# Patient Record
Sex: MALE | Race: WHITE | NOT HISPANIC OR LATINO | Employment: FULL TIME | URBAN - METROPOLITAN AREA
[De-identification: names, ages, dates, MRNs, and addresses within clinical notes are randomized per-mention and may not be internally consistent; named-entity substitution may affect disease eponyms.]

---

## 2023-09-05 ENCOUNTER — ALLIED HEALTH/NURSE VISIT (OUTPATIENT)
Dept: FAMILY MEDICINE | Facility: CLINIC | Age: 28
End: 2023-09-05
Payer: COMMERCIAL

## 2023-09-05 DIAGNOSIS — Z23 NEED FOR VACCINATION: Primary | ICD-10-CM

## 2023-09-05 PROCEDURE — 90715 TDAP VACCINE 7 YRS/> IM: CPT

## 2023-09-05 PROCEDURE — 99207 PR NO CHARGE NURSE ONLY: CPT

## 2023-09-05 PROCEDURE — 86580 TB INTRADERMAL TEST: CPT

## 2023-09-05 PROCEDURE — 90471 IMMUNIZATION ADMIN: CPT

## 2023-09-05 NOTE — NURSING NOTE
"Patient is here today for a Mantoux (TST) test placement.    Is there a current order in the chart? No. Placed order according to standing order (reference the \"Skin Test- Tuberculosis Screening- Ambulatory Care\" standing order in [employer]). Review the Inclusion and Exclusion Criteria.        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 Patient to return for second test in 1-3 weeks after first test is read.     Exclusion Criteria  None - Place order for Mantoux (TST) test per standing order.    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 No - form not needed per patient.    Instructed patient to wait for 15 minutes post injection and to report any reactions immediately to staff.    Told patient to return to clinic in 48-72 hours to have Mantoux (TST) read.         Prior to immunization administration, verified patients identity using patient s name and date of birth. Please see Immunization Activity for additional information.     Screening Questionnaire for Adult Immunization    Are you sick today?   No   Do you have allergies to medications, food, a vaccine component or latex?   No   Have you ever had a serious reaction after receiving a vaccination?   No   Do you have a long-term health problem with heart, lung, kidney, or metabolic disease (e.g., diabetes), asthma, a blood disorder, no spleen, complement component deficiency, a cochlear implant, or a spinal fluid leak?  Are you on long-term aspirin therapy?   No   Do you have cancer, leukemia, HIV/AIDS, or any other immune system problem?   No   Do you have a parent, brother, or sister with an immune system problem?   No   In the past 3 months, have you taken medications that affect  your immune system, such as prednisone, other steroids, or anticancer drugs; drugs for the treatment of rheumatoid arthritis, " Crohn s disease, or psoriasis; or have you had radiation treatments?   No   Have you had a seizure, or a brain or other nervous system problem?   No   During the past year, have you received a transfusion of blood or blood    products, or been given immune (gamma) globulin or antiviral drug?   No   For women: Are you pregnant or is there a chance you could become       pregnant during the next month?   No   Have you received any vaccinations in the past 4 weeks?   No     Immunization questionnaire answers were all negative.    I have reviewed the following standing orders:   This patient is due and qualifies for a TDAP vaccine.    Click here for Tdap Standing Order    I have reviewed the vaccines inclusion and exclusion criteria; No concerns regarding eligibility.     Patient instructed to remain in clinic for 15 minutes afterwards, and to report any adverse reactions.     Screening performed by Zoie South on 9/5/2023 at 10:28 AM.

## 2023-09-08 ENCOUNTER — ALLIED HEALTH/NURSE VISIT (OUTPATIENT)
Dept: FAMILY MEDICINE | Facility: CLINIC | Age: 28
End: 2023-09-08
Payer: COMMERCIAL

## 2023-09-08 DIAGNOSIS — Z11.1 SCREENING EXAMINATION FOR PULMONARY TUBERCULOSIS: Primary | ICD-10-CM

## 2023-09-08 LAB
PPDINDURATION: 0 MM (ref 0–4.99)
PPDREDNESS: NORMAL

## 2023-09-08 PROCEDURE — 99207 PR NO CHARGE NURSE ONLY: CPT

## 2023-09-08 NOTE — RESULT ENCOUNTER NOTE
Rafael,  I have reviewed your labs, and they are all normal. If you have questions, please notify me through MyChart or a telephone call.   Radha Lyons, DNP

## 2023-09-08 NOTE — PROGRESS NOTES
Patient is here today for a Mantoux (TST) test results.    Did patient return to clinic 48-72 hours from Mantoux (TST) placement: Yes -     PPD Induration   Date Value Ref Range Status   09/08/2023 0 0 - 4.99 mm Final     PPD Redness   Date Value Ref Range Status   09/08/2023 Not Present  Final     Induration Size? N/A    Patient needs form signed? No, letter sent to portal    Patient reports having previously had the BCG Vaccine: No    Does patient need a two step? No, patient does not think he needs a two steps, will confirm and call back to schedule if he does, RN advised of time frame this would need to be completed in    KERRIE PhanN, RN  [facility] ~ Registered Nurse  Clinic Triage ~ [place] [facility] [place]  September 8, 2023

## 2023-09-08 NOTE — LETTER
[facility] [place]  [address], [address]  [place] [address]  [phone]          9/8/2023          To Whom it May Concern:     Rafael Man, male, 1995 has had a mantoux placed on Tuesday, September 5th, 2023 at 10:38am. Patient returned to have mantoux results read on Friday, September 8th, 2023 at 9:15am.       Mantoux result is NEGATIVE:  Lab Results   Component Value Date    PPDREDNESS Not Present 09/08/2023    PPDINDURATIO 0 09/08/2023         Please contact me for questions or concerns.    Sincerely,  KERRIE PhanN, RN  [facility] ~ Registered Nurse  Clinic Triage ~ [place] [facility] [place]  September 8, 2023

## 2023-09-30 ENCOUNTER — HEALTH MAINTENANCE LETTER (OUTPATIENT)
Age: 28
End: 2023-09-30

## 2024-07-17 SDOH — HEALTH STABILITY: PHYSICAL HEALTH: ON AVERAGE, HOW MANY DAYS PER WEEK DO YOU ENGAGE IN MODERATE TO STRENUOUS EXERCISE (LIKE A BRISK WALK)?: 5 DAYS

## 2024-07-17 SDOH — HEALTH STABILITY: PHYSICAL HEALTH: ON AVERAGE, HOW MANY MINUTES DO YOU ENGAGE IN EXERCISE AT THIS LEVEL?: 30 MIN

## 2024-07-17 ASSESSMENT — SOCIAL DETERMINANTS OF HEALTH (SDOH): HOW OFTEN DO YOU GET TOGETHER WITH FRIENDS OR RELATIVES?: MORE THAN THREE TIMES A WEEK

## 2024-07-22 ENCOUNTER — OFFICE VISIT (OUTPATIENT)
Dept: FAMILY MEDICINE | Facility: CLINIC | Age: 29
End: 2024-07-22
Payer: COMMERCIAL

## 2024-07-22 VITALS
BODY MASS INDEX: 33.93 KG/M2 | TEMPERATURE: 98 F | OXYGEN SATURATION: 98 % | SYSTOLIC BLOOD PRESSURE: 133 MMHG | HEART RATE: 81 BPM | DIASTOLIC BLOOD PRESSURE: 84 MMHG | HEIGHT: 73 IN | WEIGHT: 256 LBS

## 2024-07-22 DIAGNOSIS — E66.811 CLASS 1 OBESITY DUE TO EXCESS CALORIES WITHOUT SERIOUS COMORBIDITY WITH BODY MASS INDEX (BMI) OF 33.0 TO 33.9 IN ADULT: ICD-10-CM

## 2024-07-22 DIAGNOSIS — K21.9 GASTROESOPHAGEAL REFLUX DISEASE WITHOUT ESOPHAGITIS: ICD-10-CM

## 2024-07-22 DIAGNOSIS — Z00.00 ROUTINE GENERAL MEDICAL EXAMINATION AT A HEALTH CARE FACILITY: Primary | ICD-10-CM

## 2024-07-22 DIAGNOSIS — R03.0 ELEVATED BLOOD PRESSURE READING WITHOUT DIAGNOSIS OF HYPERTENSION: ICD-10-CM

## 2024-07-22 DIAGNOSIS — E66.09 CLASS 1 OBESITY DUE TO EXCESS CALORIES WITHOUT SERIOUS COMORBIDITY WITH BODY MASS INDEX (BMI) OF 33.0 TO 33.9 IN ADULT: ICD-10-CM

## 2024-07-22 LAB
ALBUMIN SERPL BCG-MCNC: 4.9 G/DL (ref 3.5–5.2)
ALP SERPL-CCNC: 75 U/L (ref 40–150)
ALT SERPL W P-5'-P-CCNC: 26 U/L (ref 0–70)
ANION GAP SERPL CALCULATED.3IONS-SCNC: 10 MMOL/L (ref 7–15)
AST SERPL W P-5'-P-CCNC: 32 U/L (ref 0–45)
BILIRUB SERPL-MCNC: 0.3 MG/DL
BUN SERPL-MCNC: 13 MG/DL (ref 6–20)
CALCIUM SERPL-MCNC: 9.7 MG/DL (ref 8.8–10.4)
CHLORIDE SERPL-SCNC: 103 MMOL/L (ref 98–107)
CHOLEST SERPL-MCNC: 216 MG/DL
CREAT SERPL-MCNC: 0.97 MG/DL (ref 0.67–1.17)
EGFRCR SERPLBLD CKD-EPI 2021: >90 ML/MIN/1.73M2
ERYTHROCYTE [DISTWIDTH] IN BLOOD BY AUTOMATED COUNT: 11.9 % (ref 10–15)
FASTING STATUS PATIENT QL REPORTED: YES
FASTING STATUS PATIENT QL REPORTED: YES
GLUCOSE SERPL-MCNC: 93 MG/DL (ref 70–99)
HCO3 SERPL-SCNC: 26 MMOL/L (ref 22–29)
HCT VFR BLD AUTO: 43.6 % (ref 40–53)
HDLC SERPL-MCNC: 27 MG/DL
HGB BLD-MCNC: 15.2 G/DL (ref 13.3–17.7)
LDLC SERPL CALC-MCNC: 148 MG/DL
MCH RBC QN AUTO: 30.5 PG (ref 26.5–33)
MCHC RBC AUTO-ENTMCNC: 34.9 G/DL (ref 31.5–36.5)
MCV RBC AUTO: 88 FL (ref 78–100)
NONHDLC SERPL-MCNC: 189 MG/DL
PLATELET # BLD AUTO: 199 10E3/UL (ref 150–450)
POTASSIUM SERPL-SCNC: 4.5 MMOL/L (ref 3.4–5.3)
PROT SERPL-MCNC: 8 G/DL (ref 6.4–8.3)
RBC # BLD AUTO: 4.98 10E6/UL (ref 4.4–5.9)
SODIUM SERPL-SCNC: 139 MMOL/L (ref 135–145)
TRIGL SERPL-MCNC: 206 MG/DL
WBC # BLD AUTO: 4.6 10E3/UL (ref 4–11)

## 2024-07-22 PROCEDURE — 85027 COMPLETE CBC AUTOMATED: CPT | Performed by: FAMILY MEDICINE

## 2024-07-22 PROCEDURE — 80061 LIPID PANEL: CPT | Performed by: FAMILY MEDICINE

## 2024-07-22 PROCEDURE — 36415 COLL VENOUS BLD VENIPUNCTURE: CPT | Performed by: FAMILY MEDICINE

## 2024-07-22 PROCEDURE — 99385 PREV VISIT NEW AGE 18-39: CPT | Performed by: FAMILY MEDICINE

## 2024-07-22 PROCEDURE — 80053 COMPREHEN METABOLIC PANEL: CPT | Performed by: FAMILY MEDICINE

## 2024-07-22 PROCEDURE — 99213 OFFICE O/P EST LOW 20 MIN: CPT | Mod: 25 | Performed by: FAMILY MEDICINE

## 2024-07-22 ASSESSMENT — PAIN SCALES - GENERAL: PAINLEVEL: NO PAIN (0)

## 2024-07-22 NOTE — PATIENT INSTRUCTIONS
"Patient Education     Regarding your Blood Pressure:  We care about monitoring your blood pressure. We need to treat individuals with high blood pressure to decrease their risk of heart disease, stroke, and kidney disease.    Your blood pressure was elevated today. Your goal blood pressure is at least <140/90.    While I have not yet diagnosed you with hypertension (high blood pressure), I do need you to have this rechecked. Please schedule a follow up \"MA/Nursing\" visit to check your blood pressure in 2 weeks. You do not need to see me that day. If it is still high at that time, we will likely need to add a medication to treat this.    To also get some readings outside the clinic, I recommend you monitor your blood pressure twice daily (taking 2 readings each time, 1-2 minutes apart) with a home blood pressure cuff. You may need to purchase this if you do not already have one. Report these numbers to me by calling the clinic, or messaging on [employer] after 2 weeks.     If applicable, decrease your caffeine consumption.    Limit alcohol use.    Stay on a strict sleep schedule, going to bed at the same time, get up at the same time, and give yourself at least 8 hours.  If noticing snoring, daytime sleepiness, or episodes where you stop breathing in your sleep request a sleep study.    Work on weight loss/maintaining a healthy weight:  Follow the DASH diet. (https://www.nhlbi.nih.gov/education/dash-eating-plan)  Increase exercise to 30 minutes a day 5 days a week (150 minutes a week on average).    Some over the counter medications can increase blood pressures like:  Sudafed (pseudoephedrine) containing products (also called \"decongestants\")   NSAID medications like ibuprofen (Advil and Motrin) and naproxen (Aleve or Naprosyn)          Regarding your Heartburn:  Gastroesophageal reflux disease, or GERD, is a common condition where stomach acid frequently flows back into the tube that connects the mouth to the stomach, " "called the esophagus. This can cause discomfort and irritation, commonly known as \"heartburn\".     The most common causes of GERD include:  A weakened valve at the bottom of the esophagus, which usually prevents stomach acid from coming back up.  A hiatal hernia, where part of the stomach pushes up into the chest.  Use of certain medications such as:  Pain/Fever Reducers:  Ibuprofen (Advil, Motrin)  Naproxen (Aleve, Naprosyn)   Aspirin  Antibiotics  Doxycycline  Minocycline  Clindamycin  Supplements:  Iron  Potassium  Anti-depressants  Amitriptyline   Bone Density Drugs:  Alendronate (Fosamax)  Others (Not a complete list)  Infections cause irritation of the stomach, or esophagus, such as a bacteria called H. Pylori, and a fungal infection called thrush.  A less common cause is Eosinophilic Esophagitis, a kind of allergy.     It's important to avoid certain foods that can trigger symptoms:  Chocolate  Citrus fruits  Spicy foods (Costa Rican, Mexican, etc)  Caffeine  Peppermint  Fatty meals  Tomato's and tomato based sauces  Carbonated beverages may worsen symptoms and should be limited.     Eating smaller meals and avoiding lying down right after eating can also be helpful.    Keep a symptom journal to help determine if there is a certain trigger(s) to your symptoms (examples: exercise, use of medications/supplements, certain types of foods/beverages, positions, clothing, time of day, stress levels)    At-home treatments include:  Raising the head of your bed at night to reduce the chance of stomach acid flowing back into the esophagus. You could also use a wedge pillow for this.  Maintaining a healthy weight   Not smoking  Over the counter medications such as:  Tums as needed to provide quick relief by neutralizing acid.  Famotidine (Pepcid/Zantac 360) 20 mg twice daily as needed, or scheduled, to keep symptoms controlled by reducing acid production.  Omeprazole (Prilosec) or Esomeprazole (Nexium) 20 - 40 mg daily can " be used for persistent symptoms.   These medications may be taken for 4-8 weeks. Follow-up with me if needing the medications longer than this.  Rebound symptoms may occur if these medications are stopped abruptly. It may be best to reduce the dose gradually, or to take the medication every other day for 1-2 weeks prior to completely stopping these medications.     Medical tests to evaluate GERD may include an upper endoscopy, where a thin tube with a camera is inserted into the esophagus to examine it, and pH monitoring, which measures the acidity in the esophagus over a period of time. These tests help doctors understand the extent of the condition and plan appropriate treatment.    Concerning symptoms of GERD include:  Persistent heartburn or vomiting  Severe pain  Difficulty swallowing  Black tarry stools  Blood in your stools or vomit    If you experiences these symptoms regularly, it's important to follow-up with me, or another doctor right away. Consider going to the emergency department if they occur.          Preventive Care Advice   This is general advice given by our system to help you stay healthy. However, your care team may have specific advice just for you. Please talk to your care team about your preventive care needs.  Nutrition  Eat 5 or more servings of fruits and vegetables each day.  Try wheat bread, brown rice and whole grain pasta (instead of white bread, rice, and pasta).  Get enough calcium and vitamin D. Check the label on foods and aim for 100% of the RDA (recommended daily allowance).  Lifestyle  Exercise at least 150 minutes each week  (30 minutes a day, 5 days a week).  Do muscle strengthening activities 2 days a week. These help control your weight and prevent disease.  No smoking.  Wear sunscreen to prevent skin cancer.  Have a dental exam and cleaning every 6 months.  Yearly exams  See your health care team every year to talk about:  Any changes in your health.  Any medicines your  care team has prescribed.  Preventive care, family planning, and ways to prevent chronic diseases.  Shots (vaccines)   HPV shots (up to age 26), if you've never had them before.  Hepatitis B shots (up to age 59), if you've never had them before.  COVID-19 shot: Get this shot when it's due.  Flu shot: Get a flu shot every year.  Tetanus shot: Get a tetanus shot every 10 years.  Pneumococcal, hepatitis A, and RSV shots: Ask your care team if you need these based on your risk.  Shingles shot (for age 50 and up)  General health tests  Diabetes screening:  Starting at age 35, Get screened for diabetes at least every 3 years.  If you are younger than age 35, ask your care team if you should be screened for diabetes.  Cholesterol test: At age 39, start having a cholesterol test every 5 years, or more often if advised.  Bone density scan (DEXA): At age 50, ask your care team if you should have this scan for osteoporosis (brittle bones).  Hepatitis C: Get tested at least once in your life.  STIs (sexually transmitted infections)  Before age 24: Ask your care team if you should be screened for STIs.  After age 24: Get screened for STIs if you're at risk. You are at risk for STIs (including HIV) if:  You are sexually active with more than one person.  You don't use condoms every time.  You or a partner was diagnosed with a sexually transmitted infection.  If you are at risk for HIV, ask about PrEP medicine to prevent HIV.  Get tested for HIV at least once in your life, whether you are at risk for HIV or not.  Cancer screening tests  Cervical cancer screening: If you have a cervix, begin getting regular cervical cancer screening tests starting at age 21.  Breast cancer scan (mammogram): If you've ever had breasts, begin having regular mammograms starting at age 40. This is a scan to check for breast cancer.  Colon cancer screening: It is important to start screening for colon cancer at age 45.  Have a colonoscopy test every 10  years (or more often if you're at risk) Or, ask your provider about stool tests like a FIT test every year or Cologuard test every 3 years.  To learn more about your testing options, visit:   .  For help making a decision, visit:   https://bit.ly/ta50994.  Prostate cancer screening test: If you have a prostate, ask your care team if a prostate cancer screening test (PSA) at age 55 is right for you.  Lung cancer screening: If you are a current or former smoker ages 50 to 80, ask your care team if ongoing lung cancer screenings are right for you.  For informational purposes only. Not to replace the advice of your health care provider. Copyright   2023 [facility]. All rights reserved. Clinically reviewed by the [facility]itions [facility]. [employer] [id number] - REV 01/24.

## 2024-07-22 NOTE — RESULT ENCOUNTER NOTE
Humphrey Hall,    If you have not viewed these results on [employer] within 3 days, we will use an alternative method to contact you. We will contact you via the following protocol:    - Via letter if your results are normal.  - Via phone ([phone]) if your results are abnormal.     Here are my comments about your recent results:    CBC Results - Your cell counts were normal.    Please call the clinic ([phone]), or message us on [employer] with any questions you may have.     Have a great day,    Dr. Mackenzie

## 2024-07-22 NOTE — LETTER
"July 25, 2024      Rafael Man  [address]  [place] [address]        Dear Rafael,    Here are my comments about your recent results:     Lipids - Your \"bad\" cholesterol was elevated. This can be improved with diet and exercise. All animal based foods such as meat, dairy, and eggs contain cholesterol. All plant based foods such as fruits, nuts, and vegetables do not.     You do not need a medication for this at this time.     CMP Results - Your blood sugar was normal. Your kidney function, electrolytes, and liver function were normal.     Please call the clinic ([phone]), or message us on [employer] with any questions you may have.     Have a great day,     Dr. Mackenzie                  "

## 2024-07-22 NOTE — PROGRESS NOTES
Preventive Care Visit  [facility] [place] Ruiz MD, Family Medicine  Jul 22, 2024    Assessment & Plan   1. Routine general medical examination at a health care facility  Discussed personal health and safety. Routine screenings ordered as below. Appropriate anticipatory guidance, vaccinations, and health screening recommendations delivered according to the USPSTF and other appropriate society guidelines. Rafael reports understanding and in agreement with this mutually agreed upon plan.    Today's Orders:  - REVIEW OF HEALTH MAINTENANCE PROTOCOL ORDERS  - Lipid panel reflex to direct LDL Non-fasting; Future  - CBC with platelets; Future  - Comprehensive metabolic panel; Future  - Lipid panel reflex to direct LDL Non-fasting  - CBC with platelets  - Comprehensive metabolic panel    2. Class 1 obesity due to excess calories without serious comorbidity with body mass index (BMI) of 33.0 to 33.9 in adult  Noted. Encourage diet and exercise changes for weight loss and overall health improvement.    3. Elevated blood pressure reading without diagnosis of hypertension  Monitor home blood pressures. If not at goal, will increase medical anti-hypertensive therapy.    4. Gastroesophageal reflux disease without esophagitis  Symptoms consistent with GERD. No current red flag symptoms (persistent emesis, melena, hematochezia, significant weight loss, odynophagia, etc). Patient  reports that he has never smoked. He has never used smokeless tobacco.    Educated about GERD, and common causes.     Discussed conservative cares including OTC medication, avoiding NSAIDs, keeping a symptom journal, trigger food avoidance, eating small meals, healthy weight maintenance, positional triggers, etc. See AVS for details.     Patient agreeable with this plan.     Follow-up within 4-8 weeks as needed if not improving.     Future steps to consider for persistent symptoms at time of follow-up:  - Reviewing patient  symptom journal  - Review medication/dietary compliance and side effects   - Repeating exam for new findings  - Increasing pharmacotherapy (addition of Carafate, increasing H2/PPI dose/frequency)  - Referral for EGD +/- Bravo to confirm diagnosis. This would also help to evaluate for evidence of hiatal hernia, esophagitis, Jimenez's, H. Pylori and peptic ulcer disease, EoE, esophageal ring/web, crohn's, GAVE, etc.   - Manometry  - GI office visit consultation.     Follow-up Visit   Expected date:  Jul 22, 2025 (Approximate)      Follow Up Appointment Details:     Follow-up with whom?: PCP    Follow-Up for what?: Adult Preventive    How?: In Person                   Willy Ruiz MD  [facility]    Disclaimer: This note consists of symbols derived from keyboarding, dictation and/or voice recognition software. As a result, there may be errors in the script that have gone undetected. Please consider this when interpreting information found in this chart.    Bola Hall is a 28 year old, presenting for the following:  Physical and Hypertension  - Concerns for high blood pressure. Takes BP frequently at work. Believes their cuffs may possibly be too small for him as they pop off a lot as they are inflating.       7/22/2024     7:09 AM   Additional Questions   Roomed by Maikol CODY   Accompanied by Self        Health Care Directive  Patient does not have a Health Care Directive or Living Will: Discussed advance care planning with patient; however, patient declined at this time.    HPI        7/17/2024   General Health   How would you rate your overall physical health? Good   Feel stress (tense, anxious, or unable to sleep) Not at all            7/17/2024   Nutrition   Three or more servings of calcium each day? Yes   Diet: Regular (no restrictions)   How many servings of fruit and vegetables per day? (!) 2-3   How many sweetened beverages each day? 0-1            7/17/2024    Exercise   Days per week of moderate/strenous exercise 5 days   Average minutes spent exercising at this level 30 min            7/17/2024   Social Factors   Frequency of gathering with friends or relatives More than three times a week   Worry food won't last until get money to buy more No   Food not last or not have enough money for food? No   Do you have housing? (Housing is defined as stable permanent housing and does not include staying ouside in a car, in a tent, in an abandoned building, in an overnight shelter, or couch-surfing.) Yes   Are you worried about losing your housing? No   Lack of transportation? No   Unable to get utilities (heat,electricity)? No            7/17/2024   Dental   Dentist two times every year? (!) NO      Today's PHQ-2 Score:       7/22/2024     6:57 AM   PHQ-2 ( 1999 [employer])   Q1: Little interest or pleasure in doing things 0   Q2: Feeling down, depressed or hopeless 0   PHQ-2 Score 0   Q1: Little interest or pleasure in doing things Not at all   Q2: Feeling down, depressed or hopeless Not at all   PHQ-2 Score 0         7/17/2024   Substance Use   Alcohol more than 3/day or more than 7/wk No   Do you use any other substances recreationally? No      Social History     Tobacco Use    Smoking status: Never    Smokeless tobacco: Never   Substance Use Topics    Alcohol use: No    Drug use: No         7/17/2024   One time HIV Screening   Previous HIV test? Yes          7/17/2024   STI Screening   New sexual partner(s) since last STI/HIV test? No            7/17/2024   Contraception/Family Planning   Questions about contraception or family planning No     Reviewed and updated as needed this visit by Provider   Tobacco  Allergies  Meds  Problems  Med Hx  Surg Hx  Fam Hx        Social Hx Reviewed    Past Medical History:   Diagnosis Date    NO ACTIVE PROBLEMS      Past Surgical History:   Procedure Laterality Date    ENT SURGERY  4/2011    [place] teeth      Review of  "Systems  Constitutional, HEENT, cardiovascular, pulmonary, GI, , musculoskeletal, neuro, skin, endocrine and psych systems are negative, except as otherwise noted.     Objective    Exam  /84 (BP Location: Right arm, Patient Position: Sitting, Cuff Size: Adult Regular)   Pulse 81   Temp 98  F (36.7  C) (Temporal)   Ht 1.855 m (6' 1.03\")   Wt 116.1 kg (256 lb)   SpO2 98%   BMI 33.75 kg/m     Estimated body mass index is 33.75 kg/m  as calculated from the following:    Height as of this encounter: 1.855 m (6' 1.03\").    Weight as of this encounter: 116.1 kg (256 lb).    Physical Exam  Constitutional:       Appearance: He is obese.   HENT:      Head: Normocephalic and atraumatic.      Right Ear: Tympanic membrane, ear canal and external ear normal. There is no impacted cerumen.      Left Ear: Tympanic membrane, ear canal and external ear normal. There is no impacted cerumen.      Nose: Nose normal. No congestion.      Mouth/Throat:      Pharynx: Oropharynx is clear. No oropharyngeal exudate or posterior oropharyngeal erythema.   Eyes:      General:         Right eye: No discharge.         Left eye: No discharge.      Extraocular Movements: Extraocular movements intact.      Conjunctiva/sclera: Conjunctivae normal.      Pupils: Pupils are equal, round, and reactive to light.   Cardiovascular:      Rate and Rhythm: Normal rate and regular rhythm.      Heart sounds: Normal heart sounds. No murmur heard.     No friction rub.   Pulmonary:      Effort: Pulmonary effort is normal. No respiratory distress.      Breath sounds: Normal breath sounds. No wheezing or rhonchi.   Abdominal:      General: Abdomen is flat. There is no distension.      Palpations: Abdomen is soft. There is no mass.      Tenderness: There is no abdominal tenderness. There is no guarding.   Musculoskeletal:         General: No swelling.      Cervical back: Normal range of motion and neck supple. No tenderness.      Right lower leg: No edema. "      Left lower leg: No edema.   Lymphadenopathy:      Cervical: No cervical adenopathy.   Skin:     General: Skin is warm.      Findings: No rash.   Neurological:      General: No focal deficit present.      Mental Status: He is alert.      Cranial Nerves: No cranial nerve deficit.      Motor: No weakness.      Coordination: Coordination normal.      Gait: Gait normal.   Psychiatric:         Mood and Affect: Mood normal.         Behavior: Behavior normal.         Thought Content: Thought content normal.         Judgment: Judgment normal.       Labs: Pending    Signed Electronically by: Willy Ruiz MD

## 2024-07-22 NOTE — RESULT ENCOUNTER NOTE
"Humphrey Hall,    If you have not viewed these results on [employer] within 3 days, we will use an alternative method to contact you. We will contact you via the following protocol:    - Via letter if your results are normal.  - Via phone ([phone]) if your results are abnormal.     Here are my comments about your recent results:    Lipids - Your \"bad\" cholesterol was elevated. This can be improved with diet and exercise. All animal based foods such as meat, dairy, and eggs contain cholesterol. All plant based foods such as fruits, nuts, and vegetables do not.    You do not need a medication for this at this time.    CMP Results - Your blood sugar was normal. Your kidney function, electrolytes, and liver function were normal.    Please call the clinic ([phone]), or message us on [employer] with any questions you may have.     Have a great day,    Dr. Mackenzie"

## 2025-06-23 ENCOUNTER — PATIENT OUTREACH (OUTPATIENT)
Dept: CARE COORDINATION | Facility: CLINIC | Age: 30
End: 2025-06-23
Payer: COMMERCIAL

## 2025-07-07 ENCOUNTER — PATIENT OUTREACH (OUTPATIENT)
Dept: CARE COORDINATION | Facility: CLINIC | Age: 30
End: 2025-07-07
Payer: COMMERCIAL

## 2025-08-24 ENCOUNTER — HEALTH MAINTENANCE LETTER (OUTPATIENT)
Age: 30
End: 2025-08-24